# Patient Record
Sex: FEMALE | Race: OTHER | URBAN - METROPOLITAN AREA
[De-identification: names, ages, dates, MRNs, and addresses within clinical notes are randomized per-mention and may not be internally consistent; named-entity substitution may affect disease eponyms.]

---

## 2022-03-19 PROBLEM — E66.01 OBESITY, MORBID (HCC): Status: ACTIVE | Noted: 2018-10-04

## 2024-03-25 ENCOUNTER — OFFICE VISIT (OUTPATIENT)
Dept: OBGYN CLINIC | Age: 24
End: 2024-03-25
Payer: COMMERCIAL

## 2024-03-25 VITALS
WEIGHT: 245 LBS | HEIGHT: 64 IN | BODY MASS INDEX: 41.83 KG/M2 | SYSTOLIC BLOOD PRESSURE: 122 MMHG | DIASTOLIC BLOOD PRESSURE: 78 MMHG

## 2024-03-25 DIAGNOSIS — Z01.419 ENCOUNTER FOR WELL WOMAN EXAM WITH ROUTINE GYNECOLOGICAL EXAM: Primary | ICD-10-CM

## 2024-03-25 DIAGNOSIS — Z12.4 CERVICAL CANCER SCREENING: ICD-10-CM

## 2024-03-25 PROCEDURE — 99395 PREV VISIT EST AGE 18-39: CPT | Performed by: OBSTETRICS & GYNECOLOGY

## 2024-03-25 PROCEDURE — 99459 PELVIC EXAMINATION: CPT | Performed by: OBSTETRICS & GYNECOLOGY

## 2024-03-25 SDOH — ECONOMIC STABILITY: HOUSING INSECURITY
IN THE LAST 12 MONTHS, WAS THERE A TIME WHEN YOU DID NOT HAVE A STEADY PLACE TO SLEEP OR SLEPT IN A SHELTER (INCLUDING NOW)?: NO

## 2024-03-25 SDOH — ECONOMIC STABILITY: INCOME INSECURITY: HOW HARD IS IT FOR YOU TO PAY FOR THE VERY BASICS LIKE FOOD, HOUSING, MEDICAL CARE, AND HEATING?: HARD

## 2024-03-25 SDOH — ECONOMIC STABILITY: TRANSPORTATION INSECURITY
IN THE PAST 12 MONTHS, HAS LACK OF TRANSPORTATION KEPT YOU FROM MEETINGS, WORK, OR FROM GETTING THINGS NEEDED FOR DAILY LIVING?: NO

## 2024-03-25 SDOH — ECONOMIC STABILITY: FOOD INSECURITY: WITHIN THE PAST 12 MONTHS, YOU WORRIED THAT YOUR FOOD WOULD RUN OUT BEFORE YOU GOT MONEY TO BUY MORE.: SOMETIMES TRUE

## 2024-03-25 SDOH — ECONOMIC STABILITY: FOOD INSECURITY: WITHIN THE PAST 12 MONTHS, THE FOOD YOU BOUGHT JUST DIDN'T LAST AND YOU DIDN'T HAVE MONEY TO GET MORE.: PATIENT DECLINED

## 2024-03-25 NOTE — PROGRESS NOTES
[facility]    Radha Ayala is a 23 y.o. female seen for annual GYN exam.  She is due to have her Nexplanon replaced.  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      Past Medical History, Past Surgical History, Family history, Social History, and Medications were all reviewed with the patient today and updated as necessary.     Current Outpatient Medications   Medication Sig    etonogestrel (NEXPLANON) 68 MG implant      No current facility-administered medications for this visit.     Allergies   Allergen Reactions    Ondansetron Hcl Other (See Comments)     Doesn't work for me    Ceftriaxone Nausea And Vomiting     She has taken other cephalosporins.  Patient states the injection caused nausea and vomiting afterward previously when she was positive for chlamydia and GC.     Past Medical History:   Diagnosis Date    Chronic nausea     CHS    Depression     with anxiety    Herpes simplex virus (HSV) infection     Hypertension 2024    Obesity, morbid (HCC) 10/4/2018    Overactive bladder 2024    Psychiatric problem      Past Surgical History:   Procedure Laterality Date    WISDOM TOOTH EXTRACTION       Family History   Adopted: Yes   Problem Relation Age of Onset    Diabetes Mother     Heart Disease Mother     Unknown Mother     Hypertension Mother     No Known Problems Father       Social History     Tobacco Use    Smoking status: Former     Types: Cigarettes    Smokeless tobacco: Never   Substance Use Topics    Alcohol use: Not Currently       Social History     Substance and Sexual Activity   Sexual Activity Yes    Partners: Male    Birth control/protection: Implant, Condom    Comment: same partner since      OB History    Para Term  AB Living   0 0 0 0 0 0   SAB IAB Ectopic Molar Multiple Live Births   0 0 0 0 0 0       Health Maintenance    Pap smear:       Review of Systems  General: Not Present- Chills, Fever, Fatigue, Insomnia, Hot

## 2024-03-28 LAB
COLLECTION METHOD: NORMAL
CYTOLOGIST CVX/VAG CYTO: NORMAL
CYTOLOGY CVX/VAG DOC THIN PREP: NORMAL
DATE OF LMP: NORMAL
HPV REFLEX: NORMAL
Lab: NORMAL
OTHER PT INFO: NORMAL
PAP SOURCE: NORMAL
PATH REPORT.FINAL DX SPEC: NORMAL
STAT OF ADQ CVX/VAG CYTO-IMP: NORMAL

## 2024-04-22 ENCOUNTER — CLINICAL DOCUMENTATION (OUTPATIENT)
Dept: OBGYN CLINIC | Age: 24
End: 2024-04-22

## 2024-05-01 NOTE — PROGRESS NOTES
[facility]  Radha Ayala is a 23 y.o. female seen for nexplanon removal and reinsertion.    Past Medical History, Past Surgical History, Family history, Social History, and Medications were all reviewed with the patient today and updated as necessary.     Current Outpatient Medications   Medication Sig    DULoxetine (CYMBALTA) 60 MG extended release capsule     busPIRone (BUSPAR) 5 MG tablet Take 1 tablet by mouth daily    famotidine (PEPCID) 20 MG tablet Take 1 tablet by mouth 2 times daily    hydrOXYzine HCl (ATARAX) 25 MG tablet TAKE 2 TABLETS BY MOUTH EVERY DAY AS DIRECTED    lamoTRIgine (LAMICTAL) 150 MG tablet Take 1 tablet by mouth daily    pantoprazole (PROTONIX) 40 MG tablet Take 1 tablet by mouth daily    promethazine (PHENERGAN) 25 MG tablet Take 1 tablet by mouth every 6 hours as needed    zolpidem (AMBIEN) 10 MG tablet TAKE 1 TABLET BY MOUTH AT BEDTIME AS DIRECTED.    etonogestrel (NEXPLANON) 68 MG implant 2021     No current facility-administered medications for this visit.     Allergies   Allergen Reactions    Ondansetron Hcl Other (See Comments)     Doesn't work for me    Ceftriaxone Nausea And Vomiting     She has taken other cephalosporins.  Patient states the injection caused nausea and vomiting afterward previously when she was positive for chlamydia and GC.     Past Medical History:   Diagnosis Date    Chronic nausea     CHS    Depression     with anxiety    Herpes simplex virus (HSV) infection 2021    Hypertension February 2024    Obesity, morbid (HCC) 10/4/2018    Overactive bladder January 2024    Psychiatric problem      Past Surgical History:   Procedure Laterality Date    WISDOM TOOTH EXTRACTION       Family History   Adopted: Yes   Problem Relation Age of Onset    Diabetes Mother     Heart Disease Mother     Unknown Mother     Hypertension Mother     No Known Problems Father       Social History     Tobacco Use    Smoking status: Never    Smokeless tobacco: Never   Substance

## 2024-05-02 ENCOUNTER — PROCEDURE VISIT (OUTPATIENT)
Dept: OBGYN CLINIC | Age: 24
End: 2024-05-02

## 2024-05-02 VITALS — BODY MASS INDEX: 42.91 KG/M2 | DIASTOLIC BLOOD PRESSURE: 80 MMHG | SYSTOLIC BLOOD PRESSURE: 120 MMHG | WEIGHT: 250 LBS

## 2024-05-02 DIAGNOSIS — Z30.46 ENCOUNTER FOR REMOVAL AND REINSERTION OF NEXPLANON: Primary | ICD-10-CM

## 2024-05-02 RX ORDER — FAMOTIDINE 20 MG/1
20 TABLET, FILM COATED ORAL 2 TIMES DAILY
COMMUNITY
Start: 2024-04-26 | End: 2024-06-25

## 2024-05-02 RX ORDER — HYDROXYZINE HYDROCHLORIDE 25 MG/1
TABLET, FILM COATED ORAL
COMMUNITY

## 2024-05-02 RX ORDER — PROMETHAZINE HYDROCHLORIDE 25 MG/1
25 TABLET ORAL EVERY 6 HOURS PRN
COMMUNITY
Start: 2024-02-22

## 2024-05-02 RX ORDER — LAMOTRIGINE 150 MG/1
1 TABLET ORAL DAILY
COMMUNITY
Start: 2023-10-25

## 2024-05-02 RX ORDER — ZOLPIDEM TARTRATE 10 MG/1
TABLET ORAL
COMMUNITY

## 2024-05-02 RX ORDER — DULOXETIN HYDROCHLORIDE 60 MG/1
CAPSULE, DELAYED RELEASE ORAL
COMMUNITY
Start: 2018-01-25

## 2024-05-02 RX ORDER — PANTOPRAZOLE SODIUM 40 MG/1
40 TABLET, DELAYED RELEASE ORAL DAILY
COMMUNITY
Start: 2024-04-26 | End: 2024-06-25

## 2024-05-02 RX ORDER — BUSPIRONE HYDROCHLORIDE 5 MG/1
5 TABLET ORAL DAILY
COMMUNITY
Start: 2024-03-01